# Patient Record
Sex: MALE
[De-identification: names, ages, dates, MRNs, and addresses within clinical notes are randomized per-mention and may not be internally consistent; named-entity substitution may affect disease eponyms.]

---

## 2017-04-26 ENCOUNTER — HOSPITAL ENCOUNTER (OUTPATIENT)
Dept: HOSPITAL 57 - HPCALD | Age: 77
Discharge: HOME | End: 2017-04-26
Attending: FAMILY MEDICINE
Payer: MEDICARE

## 2017-04-26 DIAGNOSIS — I10: ICD-10-CM

## 2017-04-26 DIAGNOSIS — E78.5: ICD-10-CM

## 2017-04-26 DIAGNOSIS — E11.9: Primary | ICD-10-CM

## 2017-04-26 LAB
ALBUMIN SERPL BCG-MCNC: 4.2 G/DL (ref 3.4–4.8)
ALP SERPL-CCNC: 75 U/L (ref 40–150)
ALT SERPL W P-5'-P-CCNC: 11 U/L (ref 0–55)
ANION GAP SERPL CALC-SCNC: 15 MMOL/L (ref 10–20)
AST SERPL-CCNC: 13 U/L (ref 5–34)
BASOPHILS # BLD AUTO: 0.1 THOU/UL (ref 0–0.2)
BASOPHILS NFR BLD AUTO: 0.8 % (ref 0–1)
BILIRUB SERPL-MCNC: 0.4 MG/DL (ref 0.2–1.2)
BUN SERPL-MCNC: 14 MG/DL (ref 8.4–25.7)
CALCIUM SERPL-MCNC: 9.4 MG/DL (ref 7.8–10.44)
CHD RISK SERPL-RTO: 3.9 (ref ?–4.5)
CHLORIDE SERPL-SCNC: 98 MMOL/L (ref 98–107)
CHOLEST SERPL-MCNC: 106 MG/DL
CO2 SERPL-SCNC: 27 MMOL/L (ref 23–31)
CREAT CL PREDICTED SERPL C-G-VRATE: 0 ML/MIN (ref 70–130)
EOSINOPHIL # BLD AUTO: 0.2 THOU/UL (ref 0–0.7)
EOSINOPHIL NFR BLD AUTO: 2.8 % (ref 0–10)
GLOBULIN SER CALC-MCNC: 3.3 G/DL (ref 2.4–3.5)
GLUCOSE SERPL-MCNC: 169 MG/DL (ref 83–110)
HDLC SERPL-MCNC: 27 MG/DL
HGB BLD-MCNC: 12.4 G/DL (ref 14–18)
LDLC SERPL CALC-MCNC: 56 MG/DL
LYMPHOCYTES # BLD AUTO: 2.4 THOU/UL (ref 1.2–3.4)
LYMPHOCYTES NFR BLD AUTO: 33.7 % (ref 21–51)
MCH RBC QN AUTO: 28.9 PG (ref 27–31)
MCV RBC AUTO: 86.8 FL (ref 80–94)
MONOCYTES # BLD AUTO: 0.6 THOU/UL (ref 0.11–0.59)
MONOCYTES NFR BLD AUTO: 8.3 % (ref 0–10)
NEUTROPHILS # BLD AUTO: 3.8 THOU/UL (ref 1.4–6.5)
NEUTROPHILS NFR BLD AUTO: 54.4 % (ref 42–75)
PLATELET # BLD AUTO: 329 THOU/UL (ref 130–400)
POTASSIUM SERPL-SCNC: 4.6 MMOL/L (ref 3.5–5.1)
RBC # BLD AUTO: 4.3 MILL/UL (ref 4.7–6.1)
SODIUM SERPL-SCNC: 135 MMOL/L (ref 136–145)
TRIGL SERPL-MCNC: 116 MG/DL (ref ?–150)
WBC # BLD AUTO: 7 THOU/UL (ref 4.8–10.8)

## 2017-04-26 PROCEDURE — 85025 COMPLETE CBC W/AUTO DIFF WBC: CPT

## 2017-04-26 PROCEDURE — 83036 HEMOGLOBIN GLYCOSYLATED A1C: CPT

## 2017-04-26 PROCEDURE — 80061 LIPID PANEL: CPT

## 2017-04-26 PROCEDURE — 36415 COLL VENOUS BLD VENIPUNCTURE: CPT

## 2017-04-26 PROCEDURE — 80053 COMPREHEN METABOLIC PANEL: CPT

## 2017-12-20 ENCOUNTER — HOSPITAL ENCOUNTER (INPATIENT)
Dept: HOSPITAL 92 - ERS | Age: 77
LOS: 2 days | Discharge: HOME | DRG: 871 | End: 2017-12-22
Attending: INTERNAL MEDICINE | Admitting: INTERNAL MEDICINE
Payer: MEDICARE

## 2017-12-20 VITALS — BODY MASS INDEX: 26.6 KG/M2

## 2017-12-20 DIAGNOSIS — E11.22: ICD-10-CM

## 2017-12-20 DIAGNOSIS — I12.9: ICD-10-CM

## 2017-12-20 DIAGNOSIS — J18.9: ICD-10-CM

## 2017-12-20 DIAGNOSIS — G93.41: ICD-10-CM

## 2017-12-20 DIAGNOSIS — E87.1: ICD-10-CM

## 2017-12-20 DIAGNOSIS — N18.2: ICD-10-CM

## 2017-12-20 DIAGNOSIS — A41.9: Primary | ICD-10-CM

## 2017-12-20 DIAGNOSIS — Z79.4: ICD-10-CM

## 2017-12-20 LAB
ALP SERPL-CCNC: 58 U/L (ref 40–150)
ALT SERPL W P-5'-P-CCNC: 15 U/L (ref 8–55)
ANION GAP SERPL CALC-SCNC: 15 MMOL/L (ref 10–20)
APTT PPP: 31.8 SEC (ref 22.9–36.1)
AST SERPL-CCNC: 22 U/L (ref 5–34)
BASOPHILS # BLD AUTO: 0 THOU/UL (ref 0–0.2)
BASOPHILS NFR BLD AUTO: 0.4 % (ref 0–1)
BILIRUB SERPL-MCNC: 0.6 MG/DL (ref 0.2–1.2)
BUN SERPL-MCNC: 20 MG/DL (ref 8.4–25.7)
CALCIUM SERPL-MCNC: 9.1 MG/DL (ref 7.8–10.44)
CHLORIDE SERPL-SCNC: 84 MMOL/L (ref 98–107)
CK SERPL-CCNC: 62 U/L (ref 30–200)
CO2 SERPL-SCNC: 25 MMOL/L (ref 23–31)
CREAT CL PREDICTED SERPL C-G-VRATE: 0 ML/MIN (ref 70–130)
EOSINOPHIL # BLD AUTO: 0 THOU/UL (ref 0–0.7)
EOSINOPHIL NFR BLD AUTO: 0 % (ref 0–10)
GLOBULIN SER CALC-MCNC: 3.8 G/DL (ref 2.4–3.5)
HCT VFR BLD CALC: 33.8 % (ref 42–52)
HYALINE CASTS #/AREA URNS LPF: (no result) LPF
LACTATE SERPL-SCNC: 1.4 MMOL/L (ref 0.5–2.2)
LIPASE SERPL-CCNC: 49 U/L (ref 8–78)
LYMPHOCYTES # BLD: 0.8 THOU/UL (ref 1.2–3.4)
LYMPHOCYTES NFR BLD AUTO: 9.6 % (ref 21–51)
MODIFIED ALLEN'S TEST: POSITIVE
MONOCYTES # BLD AUTO: 0.6 THOU/UL (ref 0.11–0.59)
MONOCYTES NFR BLD AUTO: 7.3 % (ref 0–10)
NEUTROPHILS # BLD AUTO: 6.8 THOU/UL (ref 1.4–6.5)
PROTHROMBIN TIME: 12.8 SEC (ref 12–14.7)
RBC # BLD AUTO: 3.93 MILL/UL (ref 4.7–6.1)
RBC UR QL AUTO: (no result) HPF (ref 0–3)
SODIUM SERPL-SCNC: 122 MMOL/L (ref 135–148)
TROPONIN I SERPL DL<=0.01 NG/ML-MCNC: 0.01 NG/ML (ref ?–0.03)
VENT: NO
WBC # BLD AUTO: 8.2 THOU/UL (ref 4.8–10.8)
WBC UR QL AUTO: (no result) HPF (ref 0–3)

## 2017-12-20 PROCEDURE — 84484 ASSAY OF TROPONIN QUANT: CPT

## 2017-12-20 PROCEDURE — 80053 COMPREHEN METABOLIC PANEL: CPT

## 2017-12-20 PROCEDURE — 82805 BLOOD GASES W/O2 SATURATION: CPT

## 2017-12-20 PROCEDURE — 85730 THROMBOPLASTIN TIME PARTIAL: CPT

## 2017-12-20 PROCEDURE — 81003 URINALYSIS AUTO W/O SCOPE: CPT

## 2017-12-20 PROCEDURE — 85025 COMPLETE CBC W/AUTO DIFF WBC: CPT

## 2017-12-20 PROCEDURE — 71010: CPT

## 2017-12-20 PROCEDURE — 36416 COLLJ CAPILLARY BLOOD SPEC: CPT

## 2017-12-20 PROCEDURE — 82550 ASSAY OF CK (CPK): CPT

## 2017-12-20 PROCEDURE — 96365 THER/PROPH/DIAG IV INF INIT: CPT

## 2017-12-20 PROCEDURE — 83605 ASSAY OF LACTIC ACID: CPT

## 2017-12-20 PROCEDURE — 36415 COLL VENOUS BLD VENIPUNCTURE: CPT

## 2017-12-20 PROCEDURE — 87040 BLOOD CULTURE FOR BACTERIA: CPT

## 2017-12-20 PROCEDURE — 85007 BL SMEAR W/DIFF WBC COUNT: CPT

## 2017-12-20 PROCEDURE — 83880 ASSAY OF NATRIURETIC PEPTIDE: CPT

## 2017-12-20 PROCEDURE — 85027 COMPLETE CBC AUTOMATED: CPT

## 2017-12-20 PROCEDURE — 85610 PROTHROMBIN TIME: CPT

## 2017-12-20 PROCEDURE — 96361 HYDRATE IV INFUSION ADD-ON: CPT

## 2017-12-20 PROCEDURE — 82010 KETONE BODYS QUAN: CPT

## 2017-12-20 PROCEDURE — 82553 CREATINE MB FRACTION: CPT

## 2017-12-20 PROCEDURE — 80048 BASIC METABOLIC PNL TOTAL CA: CPT

## 2017-12-20 PROCEDURE — 81015 MICROSCOPIC EXAM OF URINE: CPT

## 2017-12-20 PROCEDURE — 93005 ELECTROCARDIOGRAM TRACING: CPT

## 2017-12-20 PROCEDURE — 83690 ASSAY OF LIPASE: CPT

## 2017-12-20 NOTE — RAD
CHEST 1 VIEW:

 

HISTORY: 

Fever.

 

COMPARISON: 

Chest radiograph from 2009.

 

FINDINGS: 

New left lower lobe airspace opacity.  There are scattered granulomas throughout the lungs.

 

Decreased subacromial space bilaterally.

 

IMPRESSION: 

Left lower lobe airspace opacity suggesting pneumonia.  Followup after treatment recommended.

 

POS: [facility]

## 2017-12-21 LAB
ANION GAP SERPL CALC-SCNC: 13 MMOL/L (ref 10–20)
BUN SERPL-MCNC: 8 MG/DL (ref 8.4–25.7)
CALCIUM SERPL-MCNC: 9.1 MG/DL (ref 7.8–10.44)
CHLORIDE SERPL-SCNC: 97 MMOL/L (ref 98–107)
CO2 SERPL-SCNC: 24 MMOL/L (ref 23–31)
CREAT CL PREDICTED SERPL C-G-VRATE: 78 ML/MIN (ref 70–130)
HCT VFR BLD CALC: 31.6 % (ref 42–52)
RBC # BLD AUTO: 3.65 MILL/UL (ref 4.7–6.1)
WBC # BLD AUTO: 4.9 THOU/UL (ref 4.8–10.8)

## 2017-12-21 NOTE — PDOC.PN
- Subjective


Encounter Start Date: 12/21/17


Encounter Start Time: 16:00


Subjective: f/u for LLL PNA and suspected sepsis. Overall feeling well today.


-: Appetite improved. Less weakness. Continues on Levaquin.





- Objective


Resuscitation Status: 


 











Resuscitation Status           FULL:Full Resuscitation














MAR Reviewed: Yes


Vital Signs & Weight: 


 Vital Signs (12 hours)











  Temp Pulse Resp BP BP Pulse Ox


 


 12/21/17 08:28   91   130/69  


 


 12/21/17 08:16  97.9 F  91  20   130/69  90 L


 


 12/21/17 08:00  97.9 F  91  20   








 Weight











Weight                         165 lb 5.547 oz














I&O: 


 











 12/20/17 12/21/17 12/22/17





 06:59 06:59 06:59


 


Intake Total  950 


 


Output Total  800 


 


Balance  150 











Result Diagrams: 


 12/21/17 04:32





 12/21/17 04:32


Additional Labs: 


 Accuchecks











  12/21/17 12/21/17 12/20/17





  12:00 04:15 21:08


 


POC Glucose  145 H  98  239 H








Microbiology





12/20/17 01:35   Nasopharyngeal swab   Influenza Types A,B Direct EIA - Final


12/20/17 00:48   Venous blood - Right Arm   Blood Culture - Preliminary


                              Specimen has been received and culture in 

progress.


                              No Growth to date.


12/20/17 00:40   Venous blood - Left Arm   Blood Culture - Preliminary


                              Specimen has been received and culture in 

progress.


                              No Growth to date.





 Laboratory Tests











  12/20/17 12/20/17 12/21/17





  00:40 00:40 04:32


 


Hgb  11.7 L  


 


Neutrophils %  82.8 H  


 


Neutrophils % (Manual)    62


 


Sodium   120 L 


 


Creatinine   1.26 














Phys Exam





- Physical Examination


Constitutional: NAD


HEENT: PERRLA, oral pharynx no lesions


Neck: no JVD, supple


Respiratory: no wheezing, clear to auscultation bilateral


Cardiovascular: RRR


Gastrointestinal: soft, non-tender, no distention, positive bowel sounds


Musculoskeletal: no edema, pulses present


Neurological: normal sensation, moves all 4 limbs


Psychiatric: A&O x 3


Skin: normal turgor, cap refill <2 seconds





Dx/Plan


(1) Sepsis


Code(s): A41.9 - SEPSIS, UNSPECIFIED ORGANISM   Status: Suspected   Comment: 

Resolving, continue treatment as outlined in #2   





(2) LLL pneumonia


Code(s): J18.1 - LOBAR PNEUMONIA, UNSPECIFIED ORGANISM   Status: Acute   Comment

: Suspected with gm+ cocci, continue Levaquin, general pulmonary supportive 

measures   





(3) Hyponatremia


Code(s): E87.1 - HYPO-OSMOLALITY AND HYPONATREMIA   Status: Acute   Comment: 

Improved, continue IV NS another 24h, repeat Na+ in am   





(4) Acute metabolic encephalopathy


Code(s): G93.41 - METABOLIC ENCEPHALOPATHY   Status: Acute   Comment: Resolved 

  





(5) CKD (chronic kidney disease), stage II


Code(s): N18.2 - CHRONIC KIDNEY DISEASE, STAGE 2 (MILD)   Status: Chronic   





(6) DM II (diabetes mellitus, type II), controlled


Code(s): E11.9 - TYPE 2 DIABETES MELLITUS WITHOUT COMPLICATIONS   Status: 

Chronic   Comment: Continue Levemir 30u SC BID, ISS   





- Plan


continue antibiotics, PT/OT, , out of bed/ambulate, DVT proph w/

SCDs


Stable overall


-: Continue Levaquin 750mg IV daily x 24h then convert to po


-: Continue IV NS 100ml/h


-: OOB/ambulate


-: AM lab: BMP, CBC





* .

## 2017-12-22 VITALS — DIASTOLIC BLOOD PRESSURE: 76 MMHG | TEMPERATURE: 97.6 F | SYSTOLIC BLOOD PRESSURE: 144 MMHG

## 2017-12-22 LAB
ANION GAP SERPL CALC-SCNC: 14 MMOL/L (ref 10–20)
BUN SERPL-MCNC: 5 MG/DL (ref 8.4–25.7)
CALCIUM SERPL-MCNC: 8.9 MG/DL (ref 7.8–10.44)
CHLORIDE SERPL-SCNC: 98 MMOL/L (ref 98–107)
CO2 SERPL-SCNC: 23 MMOL/L (ref 23–31)
CREAT CL PREDICTED SERPL C-G-VRATE: 0 ML/MIN (ref 70–130)
HCT VFR BLD CALC: 33.2 % (ref 42–52)
RBC # BLD AUTO: 3.77 MILL/UL (ref 4.7–6.1)
WBC # BLD AUTO: 4.8 THOU/UL (ref 4.8–10.8)

## 2017-12-22 NOTE — DIS
DATE OF ADMISSION:  12/20/2017

 

DATE OF DISCHARGE:  12/22/2017

 

DISCHARGE DIAGNOSES:

1.  Sepsis syndrome, secondary to #2, resolving.

2.  Left lower lobe community-acquired pneumonia with suspected gram-positive cocci, improved.

3.  Hyponatremia, multifactorial, improved.

5.  Acute metabolic encephalopathy, resolved.

6.  Chronic kidney disease, stage 2, stable.

7.  Diabetes mellitus, type 2, insulin-requiring, stable.

 

CONSULTATIONS:  None.

 

PERTINENT LABORATORY AND X-RAY FINDINGS:  Sodium level ranged between 120-131, creatinine ranged betw
een 0.86-1.26 with estimated GFR ranging between 56-86.  Lactic acid level 1.4.  BNP 31.2, albumin 4.
0, lipase 49.  CBC showed a white blood cell count ranging between 4.9-8.2, hemoglobin ranging betwee
n 10.4-11.7.  Beta-hydroxybutyrate level 0.16.  Blood cultures x2 dated 12/20/2017 showed no growth t
o date.  Influenza A and B antigen 12/20/2017 negative.  Portable chest x-ray dated 12/20/2017 showed
 left lower lobe opacity suggesting pneumonia.

 

HOSPITAL COURSE:  Patient was admitted to the medical floor after presenting with confusion, fever, a
nd chest imaging showing infiltrate of the left lower lobe concerning for pneumonia.  Patient was ini
tially managed with IV Levaquin 750 mg daily, as well as bronchodilator therapy with DuoNeb.  Patient
 was also noted with concomitant hyponatremia, initiated on intravenous normal saline with serial sod
ium evaluation showing overall improvement by the time of discharge.  The patient was also noted with
 mild encephalopathy secondarily to his presentation with pneumonia and hyponatremia, symptomatically
 managed and resolved by the time of discharge.  Overall, the patient did remain clinically stable th
roughout the hospital course, tolerating regular oral intake, ambulating without assistance or diffic
ulty and voiding appropriately.  The patient overall clinically at baseline functional status and ghazal
dy for discharge 12/22/2017.

 

DISCHARGE MEDICATIONS:

1.  Levaquin 500 mg 1 tab p.o. daily x7 days.

2.  Enteric-coated aspirin 81 mg 1 tab p.o. daily.

3.  Felodipine ER 5 mg p.o. daily.

4.  Glargine insulin 30 units subcutaneously b.i.d.

5.  Metoprolol tartrate 100 mg p.o. b.i.d.

6.  Zocor 40 mg p.o. at bedtime.

 

FOLLOWUP:  Patient will follow up with his primary care provider, Dr. Sky Garcias, within 7 days.

 

CONDITION ON DISCHARGE:  Stable.

 

ACTIVITY:  Ad genevieve.

 

DIET:  Heart healthy and ADA.

 

CODE STATUS:  FULL.

 

DISPOSITION:  Home, 12/22/2017.

## 2017-12-25 NOTE — EKG
Test Reason : 

Blood Pressure : ***/*** mmHG

Vent. Rate : 080 BPM     Atrial Rate : 080 BPM

   P-R Int : 148 ms          QRS Dur : 094 ms

    QT Int : 422 ms       P-R-T Axes : 065 043 063 degrees

   QTc Int : 486 ms

 

Normal sinus rhythm with sinus arrhythmia

Prolonged QT

Abnormal ECG

 

Confirmed by SUAD CORDON, ARSEN ([id number]),  MAIKOL GAYLE ([id number]) on 12/25/2017 1:52:46 PM

 

Referred By:  SUAD           Confirmed By:ARSEN BORJAS MD

## 2017-12-26 NOTE — PQF
ERIK CARVALHORADHA DO

[id number]                                                             [id number]
[id number]

[id number]                             

                                   

CLINICAL DOCUMENTATION CLARIFICATION FORM:  POST DISCHARGE



Addendum to original discharge summary date:  __________________________________
____



Late entry note date:  _________________________________________________________
__











DATE: 12/26/2017                                      ATTN:   



Please exercise your independent, professional judgment in responding to the 
clarification form. 

Clinical indicators are provided on the bottom of this form for your review



Please check appropriate box(s):

 [  x] Sepsis due to: (Pna, UTI, gangrenous gall bladder, etc.) ___Pneumonia____
__________________________________

                 Due to:  [  ] Device (please specify) _________________________
_________________________________  

         [  ] Implant   [  ] Graft   [  ] Infusion      

[  ] SIRS due to non-infectious process (please specify etiology) ______________
______________________

[  ] with organ dysfunction     [  ] without organ dysfunction

[  ] Severe sepsis with acute organ dysfunction of: ____________________________
______________________

(Examples: respiratory failure, encephalopathy, acute kidney failure, other) 

[  ] Localized infection without sepsis

[  ] Other diagnosis ___________

[  ] Unable to determine



In addition, please specify:

Present on Admission (POA):  [ x ] Yes             [  ] No             [  ] 
Unable to determine



For continuity of documentation, please document condition throughout progress 
notes and discharge summary.  Thank You.



CLINICAL INDICATORS - SIGNS / SYMPTOMS / LABS:

Altered mental status

Fever 101.0, PULSE: 81, RESP: 30, /59

 H&P - SEPSIS SYNDROME 2/2 TO LLL COMMUNITY-ACQUIRED PNEUMONIA, LIKELY GRAM 
POSITIVE COCCI

 PN - SEPSIS

 DS-  SEPSIS SYNDROME





RISK FACTORS:

PNEUMONIA

HYPONATREMIA

ACUTE METABOLIC ENCEPHALOPATHY

DIABETES



TREATMENTS:

Initiation SEPSIS PROTOCOL

IV ANTIBIOTICS













(This form is maintained as a part of the permanent medical record)

2015 [employer].  All Rights Reserved

Tata Jauregui, ALIE, [facility]    [email or web address]    
[phone]

                                                              



 

MITZI

## 2019-07-24 ENCOUNTER — HOSPITAL ENCOUNTER (INPATIENT)
Dept: HOSPITAL 92 - ERS | Age: 79
LOS: 4 days | Discharge: HOME | DRG: 645 | End: 2019-07-28
Attending: INTERNAL MEDICINE | Admitting: INTERNAL MEDICINE
Payer: MEDICARE

## 2019-07-24 VITALS — BODY MASS INDEX: 23.3 KG/M2

## 2019-07-24 DIAGNOSIS — Z90.49: ICD-10-CM

## 2019-07-24 DIAGNOSIS — Z79.4: ICD-10-CM

## 2019-07-24 DIAGNOSIS — Z79.899: ICD-10-CM

## 2019-07-24 DIAGNOSIS — M48.061: ICD-10-CM

## 2019-07-24 DIAGNOSIS — Z95.5: ICD-10-CM

## 2019-07-24 DIAGNOSIS — N18.2: ICD-10-CM

## 2019-07-24 DIAGNOSIS — E11.22: ICD-10-CM

## 2019-07-24 DIAGNOSIS — E22.2: Primary | ICD-10-CM

## 2019-07-24 DIAGNOSIS — D63.1: ICD-10-CM

## 2019-07-24 DIAGNOSIS — E78.5: ICD-10-CM

## 2019-07-24 DIAGNOSIS — I25.2: ICD-10-CM

## 2019-07-24 DIAGNOSIS — Z79.82: ICD-10-CM

## 2019-07-24 DIAGNOSIS — I25.10: ICD-10-CM

## 2019-07-24 DIAGNOSIS — M40.56: ICD-10-CM

## 2019-07-24 DIAGNOSIS — M54.16: ICD-10-CM

## 2019-07-24 DIAGNOSIS — I12.9: ICD-10-CM

## 2019-07-24 LAB
ALBUMIN SERPL BCG-MCNC: 4.1 G/DL (ref 3.4–4.8)
ALP SERPL-CCNC: 82 U/L (ref 40–150)
ALT SERPL W P-5'-P-CCNC: (no result) U/L (ref 8–55)
ANION GAP SERPL CALC-SCNC: 12 MMOL/L (ref 10–20)
ANION GAP SERPL CALC-SCNC: 15 MMOL/L (ref 10–20)
ANION GAP SERPL CALC-SCNC: 15 MMOL/L (ref 10–20)
AST SERPL-CCNC: 15 U/L (ref 5–34)
BACTERIA UR QL AUTO: (no result) HPF
BASOPHILS # BLD AUTO: 0.1 THOU/UL (ref 0–0.2)
BASOPHILS NFR BLD AUTO: 0.4 % (ref 0–1)
BILIRUB SERPL-MCNC: 0.8 MG/DL (ref 0.2–1.2)
BUN SERPL-MCNC: 14 MG/DL (ref 8.4–25.7)
BUN SERPL-MCNC: 14 MG/DL (ref 8.4–25.7)
BUN SERPL-MCNC: 15 MG/DL (ref 8.4–25.7)
CALCIUM SERPL-MCNC: 9.2 MG/DL (ref 7.8–10.44)
CALCIUM SERPL-MCNC: 9.7 MG/DL (ref 7.8–10.44)
CALCIUM SERPL-MCNC: 9.9 MG/DL (ref 7.8–10.44)
CHLORIDE SERPL-SCNC: 86 MMOL/L (ref 98–107)
CHLORIDE SERPL-SCNC: 86 MMOL/L (ref 98–107)
CHLORIDE SERPL-SCNC: 87 MMOL/L (ref 98–107)
CO2 SERPL-SCNC: 22 MMOL/L (ref 23–31)
CO2 SERPL-SCNC: 23 MMOL/L (ref 23–31)
CO2 SERPL-SCNC: 23 MMOL/L (ref 23–31)
CREAT CL PREDICTED SERPL C-G-VRATE: 0 ML/MIN (ref 70–130)
CREAT CL PREDICTED SERPL C-G-VRATE: 50 ML/MIN (ref 70–130)
CREAT CL PREDICTED SERPL C-G-VRATE: 50 ML/MIN (ref 70–130)
EOSINOPHIL # BLD AUTO: 0.2 THOU/UL (ref 0–0.7)
EOSINOPHIL NFR BLD AUTO: 1.6 % (ref 0–10)
GLOBULIN SER CALC-MCNC: 3.9 G/DL (ref 2.4–3.5)
GLUCOSE SERPL-MCNC: 142 MG/DL (ref 83–110)
GLUCOSE SERPL-MCNC: 174 MG/DL (ref 83–110)
GLUCOSE SERPL-MCNC: 265 MG/DL (ref 83–110)
HGB BLD-MCNC: 11.1 G/DL (ref 14–18)
IRON SERPL-MCNC: 31 UG/DL (ref 65–175)
LYMPHOCYTES # BLD: 1.9 THOU/UL (ref 1.2–3.4)
LYMPHOCYTES NFR BLD AUTO: 14.4 % (ref 21–51)
MCH RBC QN AUTO: 27.7 PG (ref 27–31)
MCV RBC AUTO: 85.1 FL (ref 78–98)
MONOCYTES # BLD AUTO: 1.1 THOU/UL (ref 0.11–0.59)
MONOCYTES NFR BLD AUTO: 8.6 % (ref 0–10)
NEUTROPHILS # BLD AUTO: 9.9 THOU/UL (ref 1.4–6.5)
NEUTROPHILS NFR BLD AUTO: 75 % (ref 42–75)
PLATELET # BLD AUTO: 365 THOU/UL (ref 130–400)
POTASSIUM SERPL-SCNC: 3.7 MMOL/L (ref 3.5–5.1)
POTASSIUM SERPL-SCNC: 3.8 MMOL/L (ref 3.5–5.1)
POTASSIUM SERPL-SCNC: 4 MMOL/L (ref 3.5–5.1)
RBC # BLD AUTO: 3.99 MILL/UL (ref 4.7–6.1)
RBC UR QL AUTO: (no result) HPF (ref 0–3)
SODIUM SERPL-SCNC: 117 MMOL/L (ref 136–145)
SODIUM SERPL-SCNC: 120 MMOL/L (ref 136–145)
SODIUM SERPL-SCNC: 120 MMOL/L (ref 136–145)
UIBC SERPL-MCNC: 274 MCG/DL (ref 261–462)
WBC # BLD AUTO: 13.1 THOU/UL (ref 4.8–10.8)
WBC UR QL AUTO: (no result) HPF (ref 0–3)

## 2019-07-24 PROCEDURE — 81003 URINALYSIS AUTO W/O SCOPE: CPT

## 2019-07-24 PROCEDURE — 83036 HEMOGLOBIN GLYCOSYLATED A1C: CPT

## 2019-07-24 PROCEDURE — C9488 CONIVAPTAN HCL: HCPCS

## 2019-07-24 PROCEDURE — 85025 COMPLETE CBC W/AUTO DIFF WBC: CPT

## 2019-07-24 PROCEDURE — 82607 VITAMIN B-12: CPT

## 2019-07-24 PROCEDURE — 90471 IMMUNIZATION ADMIN: CPT

## 2019-07-24 PROCEDURE — 93010 ELECTROCARDIOGRAM REPORT: CPT

## 2019-07-24 PROCEDURE — 84300 ASSAY OF URINE SODIUM: CPT

## 2019-07-24 PROCEDURE — 36416 COLLJ CAPILLARY BLOOD SPEC: CPT

## 2019-07-24 PROCEDURE — 71045 X-RAY EXAM CHEST 1 VIEW: CPT

## 2019-07-24 PROCEDURE — 36415 COLL VENOUS BLD VENIPUNCTURE: CPT

## 2019-07-24 PROCEDURE — 90670 PCV13 VACCINE IM: CPT

## 2019-07-24 PROCEDURE — 82533 TOTAL CORTISOL: CPT

## 2019-07-24 PROCEDURE — 83550 IRON BINDING TEST: CPT

## 2019-07-24 PROCEDURE — 80053 COMPREHEN METABOLIC PANEL: CPT

## 2019-07-24 PROCEDURE — 83540 ASSAY OF IRON: CPT

## 2019-07-24 PROCEDURE — 81015 MICROSCOPIC EXAM OF URINE: CPT

## 2019-07-24 PROCEDURE — G0009 ADMIN PNEUMOCOCCAL VACCINE: HCPCS

## 2019-07-24 PROCEDURE — 80048 BASIC METABOLIC PNL TOTAL CA: CPT

## 2019-07-24 PROCEDURE — 84484 ASSAY OF TROPONIN QUANT: CPT

## 2019-07-24 PROCEDURE — 84443 ASSAY THYROID STIM HORMONE: CPT

## 2019-07-24 PROCEDURE — 83935 ASSAY OF URINE OSMOLALITY: CPT

## 2019-07-24 PROCEDURE — 93005 ELECTROCARDIOGRAM TRACING: CPT

## 2019-07-24 PROCEDURE — 72131 CT LUMBAR SPINE W/O DYE: CPT

## 2019-07-24 RX ADMIN — INSULIN LISPRO PRN UNIT: 100 INJECTION, SOLUTION INTRAVENOUS; SUBCUTANEOUS at 17:21

## 2019-07-24 RX ADMIN — INSULIN GLARGINE SCH MLS: 100 INJECTION, SOLUTION SUBCUTANEOUS at 20:45

## 2019-07-24 RX ADMIN — INSULIN LISPRO PRN UNIT: 100 INJECTION, SOLUTION INTRAVENOUS; SUBCUTANEOUS at 12:15

## 2019-07-24 NOTE — RAD
CHEST 1 VIEW:

 

Date:  07/24/19 

 

INDICATION:

Diabetic patient with lethargy, and leg and hip pain. 

 

FINDINGS:

There is scattered calcified granuloma. Heart size upper limits of normal. There is scattered degener
ative change. 

 

IMPRESSION: 

No acute abnormality. 

 

 

POS: [facility]

## 2019-07-24 NOTE — PDOC.EVN
Event Note





- Event Note


Event Note: 





daughter and i had long conversation. she is not happy with my assessment. she 

requested different hospitalist

## 2019-07-24 NOTE — RAD
LEFT HIP 2 VIEWS:

 

Date:  07/24/19 

 

INDICATION:

Left hip pain. 

 

COMPARISON:  

None. 

 

FINDINGS/IMPRESSION: 

There is mild degenerative arthrosis of the left hip. There are vascular calcifications within the ad
jacent soft tissues. There is diffuse osteopenia. No definite acute fracture or subluxation is demons
trated. 

 

 

POS: [facility]

## 2019-07-24 NOTE — CT
CT LUMBAR SPINE:



Date:  7/24/2019



COMPARISON: None.



HISTORY: Lumbar radiculopathy.



TECHNIQUE: Axial CT imaging at 2.5 mm intervals through the lumbar spine without contrast with corona
l and sagittal reformatted imaging.



FINDINGS: 

Evaluation for central canal and/or neural foraminal stenosis is limited on routine CT examination.



There is mild anterolisthesis at L3-4 measuring approximately 4-5 mm.



There is extensive atherosclerotic calcification of the abdominal aorta and its branches, with promin
ent atherosclerotic calcification of the infrarenal abdominal aorta and the pelvic arterial

structures.



There is significant bilateral sacroiliac joint degenerative change with anterior osteophyte formatio
n noted bilaterally, right greater than left.



T12-L1: There is disc space narrowing and anterior osteophyte formation with bilateral facet hypertro
phy and probable mild disc bulge. There is probable mild bilateral neural foraminal stenosis. No

osseous cause of significant central canal stenosis.



L1-2: Disc space narrowing, vacuum disc formation, and posterior disc osteophyte complex. Bilateral f
acet hypertrophy and hypertrophy of ligamentum flavum. Moderate central canal stenosis suspected

with at least moderate bilateral neural foraminal stenosis.



L2-3: Bilateral facet hypertrophy. Disc bulge noted with prominent ligamentum flavum hypertrophy. Sev
ere central canal stenosis is suspected. Moderate bilateral neural foraminal stenosis, left greater

than right.



L3-4: There is disc space narrowing and vacuum disc formation with severe bilateral facet hypertrophy
 and hypertrophy of the ligamentum flavum. Severe central canal stenosis. Severe bilateral neural

foraminal stenosis



L4-5: Severe degenerative endplate changes are present with disc space narrowing and vacuum disc form
ation. There is posterior osteophyte and there is osteophyte encroachment on bilateral neural

foramina, right greater than left, with severe central canal stenosis and severe bilateral neural for
aminal stenosis



L5-S1: There is disc space narrowing and vacuum disc formation with bilateral facet hypertrophy and d
isc osteophyte complex. Probable mild central canal stenosis. Moderate/severe bilateral neural

foraminal stenosis.



No worrisome lytic or blastic bone lesion. No acute fracture or dislocation.



IMPRESSION: 

Severe multilevel lumbar spine degenerative change as described above. Evaluation for central canal o
r neural foraminal stenosis could be best assessed via MRI or lumbar spine myelogram as clinically

warranted.



Transcribed Date/Time: 7/24/2019 11:11 AM



Reported By: Binu Rapp 

Electronically Signed:  7/24/2019 3:11 PM

## 2019-07-24 NOTE — HP
PRIMARY CARE PHYSICIAN:  Sky Garcias MD.



HISTORY OF PRESENT ILLNESS:  The patient referred to [facility] 
for

weakness and hyponatremia.  The patient presented with left leg weakness.  He 
states

he has pain in his left hip, down his left leg present for 2 weeks.  He has had 
no

medicines for it.  He denies any other problems, specifically no weakness,

dizziness, etc. 



PAST MEDICAL HISTORY:  Pertinent for,

1. Diabetes mellitus type 2, insulin dependent.

2. Hypertension.

3. Hyperlipidemia.

4. Coronary artery disease, post MI in the distant past.



PAST SURGICAL HISTORY:  

1. Laparoscopic cholecystectomy.

2. Post PCI and cardiac cath.



CURRENT MEDICATIONS:  

1. Metoprolol 100 mg twice a day.

2. Zocor 40 mg a day.

3. Aspirin 81 mg a day.

4. Lantus, unknown dose.

5. Lisinopril 20 mg a day.

6. Unknown insulin, family is going to obtain that.



ALLERGIES:  NONE.



FAMILY HISTORY:  Positive for hypertension.  The patient resides in [place].

.  Full code status.  Decision maker is Tita Cantrell.  Brother with

diabetes. 



SOCIAL HISTORY:  No tobacco.  No alcohol.



REVIEW OF SYSTEMS:  GENERAL:  No headaches, dizziness, fainting, fever, or 
chills. 

EYES:  Glasses.  No double vision, blurred vision, or flashing lights. 

EARS, NOSE, AND THROAT:  No ear pain or drainage.  No nasal bleeding.  No 
trouble

swallowing. 

CARDIAC:  No chest pain, orthopnea, or paroxysmal nocturnal dyspnea. 

RESPIRATION:  No cough, wheezing, or asthma. 

GASTROINTESTINAL:  He states he has a poor appetite.  He has lost 8 to 9 pounds 
in

the past 2 weeks.  He has had no nausea, vomiting, abdominal pain, or diarrhea, 
etc. 

GENITOURINARY:  No hematuria or dysuria. 

MUSCULOSKELETAL:  Other than the present illness, no pain in his legs or arms. 

NEUROLOGIC:  No strokes, seizures, or focal weakness. 

PSYCHIATRIC:  No anxiety or depression. 

SKIN:  No bruising, bleeding, or rash. 

HEME/LYMPH:  No tender or swollen lymph nodes in the axilla, inguinal, or 
cervical

area. 

PSYCHIATRIC:  The patient is alert, appropriate, and cooperative.  Family at 
bedside.



PHYSICAL EXAMINATION:

VITAL SIGNS:  Blood pressure 148/84, pulse 69, respirations 18, and temperature

97.8. 

HEENT:  Examination of his head, eyes, ears, nose, and throat revealed pupils 
equal

and round.  Extraocular movements are intact.  Sclerae are white.  Tympanic

membranes are clear.  Nose is clear.  Oral mucous membranes are wet.  Dental 
hygiene

is good. 

NECK:  No jugular venous distention, adenopathy, or thyromegaly. 

CHEST:  Clear to auscultation and percussion. 

HEART:  He had a basic underlying regular rhythm with every 4th beat dropped.  
First

and second second heart sounds are clear.  There are no murmurs or gallops. 

ABDOMEN:  Soft.  Bowel sounds are normal.  There is no hepatosplenomegaly.  No 
mass.

 No rebound.  No tenderness.  Bowel sounds are present. 

EXTREMITIES:  No cyanosis, clubbing, or edema. 

PULSES:  Carotid, radial, femoral, and dorsalis pedis pulses intact. 

SKIN:  Warm and dry without bruises or rash. 

HEME/LYMPH:  No tender or swollen lymph nodes in axilla, inguinal, or cervical 
area. 

NEUROLOGIC:  Cranial nerves 2 through 12 are intact.  Moves all extremities.  
Toes

downgoing. 



DIAGNOSTIC DATA:  Chest x-ray, scattered small granulomas.  Cardiac silhouette

normal.  No infiltrates or congestive heart failure, reviewed by me. 



Hip x-ray; no fracture, mild degenerative changes, reviewed by me. 



EKG, none available.  One has been ordered because of his irregular heart.



LABORATORY DATA:  Sodium 120, potassium 4.0, chloride 86, CO2 of 23, BUN 14, and

creatinine 0.99, blood sugar 142.  Liver function tests are normal.  Urine 
shows 4

to 6 white cells, but no nitrite or esterase.  Of note, CBC; white count 13.1,

platelet count 265,000, and hemoglobin 11.1. 



ASSESSMENT:  

1. Hyponatremia.

2. Mild anemia.

3. Left hip pain.

4. Diabetes mellitus type 2.

5. Hypertension.

6. Coronary artery disease.



PLAN:  

1. IV saline at 75 mL an hour.  Cortisol level, TSH level.

2. Iron, iron binding capacity, vitamin B12 level.

3. Accu-Cheks and sliding scale.

4. CT of lumbar spine.  We will re-evaluate when data is available.







Job ID:  [id number]



MTDD

## 2019-07-25 LAB
ANION GAP SERPL CALC-SCNC: 12 MMOL/L (ref 10–20)
BUN SERPL-MCNC: 13 MG/DL (ref 8.4–25.7)
CALCIUM SERPL-MCNC: 9.2 MG/DL (ref 7.8–10.44)
CHLORIDE SERPL-SCNC: 90 MMOL/L (ref 98–107)
CO2 SERPL-SCNC: 21 MMOL/L (ref 23–31)
CREAT CL PREDICTED SERPL C-G-VRATE: 58 ML/MIN (ref 70–130)
GLUCOSE SERPL-MCNC: 148 MG/DL (ref 83–110)
POTASSIUM SERPL-SCNC: 3.3 MMOL/L (ref 3.5–5.1)
SODIUM SERPL-SCNC: 120 MMOL/L (ref 136–145)

## 2019-07-25 RX ADMIN — INSULIN GLARGINE SCH MLS: 100 INJECTION, SOLUTION SUBCUTANEOUS at 20:20

## 2019-07-25 RX ADMIN — DORZOLAMIDE HYDROCHLORIDE AND TIMOLOL MALEATE SCH DROP: 22.3; 6.8 SOLUTION/ DROPS OPHTHALMIC at 20:20

## 2019-07-25 RX ADMIN — INSULIN LISPRO PRN UNIT: 100 INJECTION, SOLUTION INTRAVENOUS; SUBCUTANEOUS at 12:08

## 2019-07-25 RX ADMIN — INSULIN GLARGINE SCH MLS: 100 INJECTION, SOLUTION SUBCUTANEOUS at 12:07

## 2019-07-25 RX ADMIN — INSULIN LISPRO PRN UNIT: 100 INJECTION, SOLUTION INTRAVENOUS; SUBCUTANEOUS at 16:38

## 2019-07-26 LAB
ANION GAP SERPL CALC-SCNC: 11 MMOL/L (ref 10–20)
ANION GAP SERPL CALC-SCNC: 12 MMOL/L (ref 10–20)
BUN SERPL-MCNC: 11 MG/DL (ref 8.4–25.7)
BUN SERPL-MCNC: 11 MG/DL (ref 8.4–25.7)
CALCIUM SERPL-MCNC: 9.1 MG/DL (ref 7.8–10.44)
CALCIUM SERPL-MCNC: 9.2 MG/DL (ref 7.8–10.44)
CHLORIDE SERPL-SCNC: 94 MMOL/L (ref 98–107)
CHLORIDE SERPL-SCNC: 97 MMOL/L (ref 98–107)
CO2 SERPL-SCNC: 24 MMOL/L (ref 23–31)
CO2 SERPL-SCNC: 25 MMOL/L (ref 23–31)
CREAT CL PREDICTED SERPL C-G-VRATE: 56 ML/MIN (ref 70–130)
CREAT CL PREDICTED SERPL C-G-VRATE: 63 ML/MIN (ref 70–130)
GLUCOSE SERPL-MCNC: 131 MG/DL (ref 83–110)
GLUCOSE SERPL-MCNC: 62 MG/DL (ref 83–110)
POTASSIUM SERPL-SCNC: 3.7 MMOL/L (ref 3.5–5.1)
POTASSIUM SERPL-SCNC: 3.8 MMOL/L (ref 3.5–5.1)
SODIUM SERPL-SCNC: 125 MMOL/L (ref 136–145)
SODIUM SERPL-SCNC: 130 MMOL/L (ref 136–145)

## 2019-07-26 RX ADMIN — INSULIN LISPRO PRN UNIT: 100 INJECTION, SOLUTION INTRAVENOUS; SUBCUTANEOUS at 20:26

## 2019-07-26 RX ADMIN — DORZOLAMIDE HYDROCHLORIDE AND TIMOLOL MALEATE SCH DROP: 22.3; 6.8 SOLUTION/ DROPS OPHTHALMIC at 20:05

## 2019-07-26 RX ADMIN — DORZOLAMIDE HYDROCHLORIDE AND TIMOLOL MALEATE SCH DROP: 22.3; 6.8 SOLUTION/ DROPS OPHTHALMIC at 08:40

## 2019-07-26 RX ADMIN — INSULIN LISPRO PRN UNIT: 100 INJECTION, SOLUTION INTRAVENOUS; SUBCUTANEOUS at 11:37

## 2019-07-26 RX ADMIN — INSULIN GLARGINE SCH MLS: 100 INJECTION, SOLUTION SUBCUTANEOUS at 11:39

## 2019-07-26 RX ADMIN — INSULIN GLARGINE SCH MLS: 100 INJECTION, SOLUTION SUBCUTANEOUS at 20:25

## 2019-07-26 RX ADMIN — ASPIRIN 81 MG CHEWABLE TABLET SCH MG: 81 TABLET CHEWABLE at 08:39

## 2019-07-26 NOTE — EKG
Test Reason : 

Blood Pressure : ***/*** mmHG

Vent. Rate : 075 BPM     Atrial Rate : 075 BPM

   P-R Int : 150 ms          QRS Dur : 084 ms

    QT Int : 426 ms       P-R-T Axes : 037 046 070 degrees

   QTc Int : 475 ms

 

Normal sinus rhythm

Normal ECG

When compared with ECG of 24-JUL-2019 06:04, (Unconfirmed)

No significant change was found

Confirmed by DR. JESUS COLLINS ([id number]) on 7/26/2019 8:13:17 AM

 

Referred By:  ROGELIO           Confirmed By:DR. JESUS COLLINS

## 2019-07-26 NOTE — CON
DATE OF CONSULTATION:  



REQUESTING PHYSICIAN:  Cynthia Colvin MD



REASON FOR CONSULTATION:  Worsening hyponatremia.



IMPRESSION:  Hyponatremia, query cause.  This is possibly in the context of

__________ intake.  However, I cannot completely rule out syndrome of inappropriate

antidiuretic hormone. 



PLAN:  Urine chemistry to evaluate the sodium and osmolality __________ categorize

the type of hyponatremia and treat accordingly. 



HISTORY OF PRESENT ILLNESS:  A 78-year-old gentleman who presented here with

weakness, noted to be hyponatremic and over the course of hospitalization, the

patient's sodium has been trending downward.  As a result of this, decision has been

taken to involve Renal in the management of this case. 



PAST MEDICAL HISTORY:  Significant for type 2 diabetes, hypertension, dyslipidemia,

coronary artery disease. 



MEDICATIONS:  Reviewed and as documented on "[employer].



ALLERGIES:  NO KNOWN DRUG ALLERGIES.



FAMILY HISTORY:  Not significantly related to present illness.



SOCIAL HISTORY:  No alcohol, no tobacco, no illicit drug use.



REVIEW OF SYSTEMS:  As documented in the body of the history.  All other systems

were reviewed and found not to be significantly related to present illness. 



PHYSICAL EXAMINATION:

GENERAL:  The patient was found not to be in any obvious distress with the following

vital signs. 

VITAL SIGNS:  Afebrile, temperature 97.7, pulse 90, respiratory rate of 20, O2

saturation of 96% with a blood pressure of 151/73. 

HEENT:  Unremarkable. 

CARDIOVASCULAR:  First and second heart sounds were heard. 

RESPIRATORY:  Clear to auscultation. 

DIGESTIVE:  Revealed a benign abdomen.  Positive bowel sounds. 

EXTREMITIES:  No peripheral edema. 

SKIN:  No new gross rash. 

LYMPHATICS:  No peripheral lymphadenopathy.



SUMMARY:  A 78-year-old gentleman who is experiencing worsening hyponatremia,

possibly in the context of syndrome of inappropriate antidiuretic hormone. 







Job ID:  [id number]

## 2019-07-26 NOTE — PRG
DATE OF SERVICE:  07/26/2019



SUBJECTIVE:  Patient noted with the following vital signs.



OBJECTIVE:  VITAL SIGNS:  Afebrile, temperature 98.5; pulse 65; respiratory rate of

20; O2 saturation of 96%; blood pressure 158/70. 

HEENT:  Unremarkable. 

CARDIOVASCULAR SYSTEM:  First and second heart sounds were heard. 

RESPIRATORY SYSTEM:  Clear to auscultation. 

DIGESTIVE SYSTEM:  Revealed a benign abdomen with positive bowel sounds. 

EXTREMITIES:  No peripheral edema. 

SKIN:  No new gross rash. 

LYMPHATICS:  No peripheral lymphadenopathy.



LABORATORY INVESTIGATION:  __________ for now.  Urine chemistry consistent with

SIADH with urine osmolality of 439 and urine sodium of 57. 



IMPRESSION:  

1. Hyponatremia in the context of __________.

2. Syndrome of inappropriate antidiuretic hormone.



PLAN:  

1. We will start this patient on anti-ADH medication tolvaptan.

2. Repeat sodium level check later today status post initiation of tolvaptan.

3. Adjust the diet to include increased portion of meat.

4. Further management to be dependent on the clinical course.







Job ID:  [id number]

## 2019-07-27 LAB
ANION GAP SERPL CALC-SCNC: 12 MMOL/L (ref 10–20)
BUN SERPL-MCNC: 15 MG/DL (ref 8.4–25.7)
CALCIUM SERPL-MCNC: 9.6 MG/DL (ref 7.8–10.44)
CHLORIDE SERPL-SCNC: 98 MMOL/L (ref 98–107)
CO2 SERPL-SCNC: 25 MMOL/L (ref 23–31)
CREAT CL PREDICTED SERPL C-G-VRATE: 62 ML/MIN (ref 70–130)
GLUCOSE SERPL-MCNC: 74 MG/DL (ref 83–110)
POTASSIUM SERPL-SCNC: 3.8 MMOL/L (ref 3.5–5.1)
SODIUM SERPL-SCNC: 131 MMOL/L (ref 136–145)

## 2019-07-27 RX ADMIN — INSULIN GLARGINE SCH MLS: 100 INJECTION, SOLUTION SUBCUTANEOUS at 08:00

## 2019-07-27 RX ADMIN — DORZOLAMIDE HYDROCHLORIDE AND TIMOLOL MALEATE SCH DROP: 22.3; 6.8 SOLUTION/ DROPS OPHTHALMIC at 20:32

## 2019-07-27 RX ADMIN — DORZOLAMIDE HYDROCHLORIDE AND TIMOLOL MALEATE SCH DROP: 22.3; 6.8 SOLUTION/ DROPS OPHTHALMIC at 08:00

## 2019-07-27 RX ADMIN — INSULIN GLARGINE SCH MLS: 100 INJECTION, SOLUTION SUBCUTANEOUS at 20:33

## 2019-07-27 RX ADMIN — INSULIN LISPRO PRN UNIT: 100 INJECTION, SOLUTION INTRAVENOUS; SUBCUTANEOUS at 17:07

## 2019-07-27 RX ADMIN — ASPIRIN 81 MG CHEWABLE TABLET SCH MG: 81 TABLET CHEWABLE at 07:58

## 2019-07-28 VITALS — TEMPERATURE: 98.1 F | SYSTOLIC BLOOD PRESSURE: 132 MMHG | DIASTOLIC BLOOD PRESSURE: 72 MMHG

## 2019-07-28 LAB
ANION GAP SERPL CALC-SCNC: 11 MMOL/L (ref 10–20)
BUN SERPL-MCNC: 14 MG/DL (ref 8.4–25.7)
CALCIUM SERPL-MCNC: 8.9 MG/DL (ref 7.8–10.44)
CHLORIDE SERPL-SCNC: 99 MMOL/L (ref 98–107)
CO2 SERPL-SCNC: 23 MMOL/L (ref 23–31)
CREAT CL PREDICTED SERPL C-G-VRATE: 60 ML/MIN (ref 70–130)
GLUCOSE SERPL-MCNC: 271 MG/DL (ref 83–110)
POTASSIUM SERPL-SCNC: 3.8 MMOL/L (ref 3.5–5.1)
SODIUM SERPL-SCNC: 129 MMOL/L (ref 136–145)

## 2019-07-28 RX ADMIN — ASPIRIN 81 MG CHEWABLE TABLET SCH MG: 81 TABLET CHEWABLE at 09:19

## 2019-07-28 RX ADMIN — INSULIN GLARGINE SCH MLS: 100 INJECTION, SOLUTION SUBCUTANEOUS at 09:21

## 2019-07-28 RX ADMIN — INSULIN LISPRO PRN UNIT: 100 INJECTION, SOLUTION INTRAVENOUS; SUBCUTANEOUS at 12:31

## 2019-07-28 RX ADMIN — DORZOLAMIDE HYDROCHLORIDE AND TIMOLOL MALEATE SCH DROP: 22.3; 6.8 SOLUTION/ DROPS OPHTHALMIC at 09:20

## 2019-07-29 NOTE — PRG
DATE OF SERVICE:  07/27/2019



This is a 30-minute initial visit note, in which 30 minutes were spent reviewing the

imaging record, evaluation, examination of the patient, and formulation of plan.

Greater than 50% time was spent in counseling on Hawa Yanez.  Mr. Yanez is a

very pleasant 78-year-old gentleman.  He states that for the last week, he has had

not so much in the way of __________ pain, but left greater than right lower

extremity pain.  __________ neurologically intact.  CT scan of his lumbar spine was

performed, which demonstrates a relatively flat back __________ lumbar lordosis with

severe spondylitic changes.  MRI is also pending at the time of my evaluation, but I

would not at all be surprised if the patient has symptomatic lumbar stenosis. 



I have discussed with the patient that the best course of action at this point is

physical therapy and we can see him in an outpatient followup.  He seems to be in

good spirits now and with excellent pain control at this time, I think that the

initial course of action will be lumbar epidural steroid injections targeting the

most stenotic areas in the lumbar spine __________ lumbar decompression.  We will

arrange followup in our clinic in the near future.  This was discussed with the

patient __________ lumbar stenosis. 







Job ID:  [id number]

## 2019-07-29 NOTE — PRG
DATE OF SERVICE:  07/28/2019



SUBJECTIVE:  The patient was seen and examined, seems to be feeling much better.



OBJECTIVE:  VITAL SIGNS:  Noted with the following vital signs; afebrile,

temperature 97.6, pulse 74, respiratory rate of 18, __________ 

HEENT:  Unremarkable. 

CARDIOVASCULAR:  First and second heart sounds were heard. 

RESPIRATORY:  Clear to auscultation. 

DIGESTIVE:  Revealed a benign abdomen. 

EXTREMITIES:  No peripheral edema. 

SKIN:  No new gross rash. 

LYMPHATICS:  No peripheral lymphadenopathy.



LABORATORY DATA:  Showed a sodium of 131.



IMPRESSION:  

1. Hyponatremia __________.

2. Syndrome of inappropriate antidiuretic hormone.



PLAN:  

1. We will start calcium as needed.  Discontinue tobacco use.

2. __________ any issues.

3. Condition of patient is good.







Job ID:  [id number]

## 2019-07-29 NOTE — DIS
DATE OF ADMISSION:  07/24/2019



DATE OF DISCHARGE:  07/28/2019



DISCHARGE DIAGNOSES:  

1. Hyponatremia.

2. Lumbar radiculopathy.

3. Lumbar spinal stenosis.

4. Lumbar neuroforaminal stenosis.



SECONDARY CHRONIC DIAGNOSES:  

1. Diabetes mellitus.

2. Hypertension.

3. Coronary artery disease.

4. Chronic kidney disease stage 2. 

5. Hyperlipidemia.



HISTORY OF PRESENT ILLNESS:  This patient is a 78-year-old male, who presented via

the emergency department with a complaint of left lower extremity pain and weakness.

 Please see the dictated history and physical dated 07/24/2019, by Dr. Cynthia Colvin

for full details.  The patient's workup in the emergency department was also notable

for hyponatremia with a sodium of 120.  The patient was subsequently admitted to the

hospital primarily for the hyponatremia. 



HOSPITAL COURSE:  With regard to hyponatremia, the patient initially had gentle

hydration with normal saline at 75 mL/h.  In spite of that, the patient's sodium

actually dropped down as well as to 117.  He subsequently had fluid restriction

consultation by Nephrology.  He subsequently had urine studies performed revealing

urine osmolality of 439 and urine sodium of 57.  Subsequently, the patient was given

a dose of tolvaptan.  Subsequently, his sodium increased up to as high as 131.  The

patient remained asymptomatic with respect to his hyponatremia and on the day of

discharge, it was 129, but was cleared by Nephrology for discharge to have

outpatient followup.  In reviewing the patient's prior records, indicates the

patient has actually only had one sodium measurement of 136 in January of 2018.

Other than that going back to April of 2017, he has been continuously hyponatremic

making this more of a chronic condition. 



With regard to the patient's lumbar radiculopathy, he had a CT scan of the lumbar

spine performed, which revealed multiple levels of lumbar spinal stenosis and

neuroforaminal stenosis.  The patient was then set up for an MRI.  However, due to

discomfort, the patient was unable to accomplish the MRI, had a conversation with

the patient regarding treatment plan.  Given the findings on the CT, it is possible

that he could potentially have some injections of the lumbar spine versus surgical

intervention.  He is hesitant to even consider surgical intervention given his

advanced age and fear of having the poor outcome.  We did discuss the possibility of

followup with a pain management specialist, which he is amenable to.  These issues

were discussed with his daughter as well.  Ultimately opted to have outpatient

followup and continue p.r.n. OTCs for pain as needed. 



The patient's other chronic medical conditions were stable.  He was maintained on

his usual home regimen of medications. 



PHYSICAL EXAMINATION:

VITAL SIGNS:  On the day of discharge, temperature was 97.6, pulse 64, O2 saturation

99% on room air, /65. 

GENERAL:  He is awake, alert, oriented, pleasant, cooperative. 

HEART:  Regular rate and rhythm without murmurs, gallops, or rubs. 

LUNGS:  Clear to auscultation bilaterally with good chest wall expansion and air

exchange. 

ABDOMEN:  Soft, nontender, and nondistended.  Positive bowel sounds.  No masses.  No

organomegaly. 

EXTREMITIES:  No cyanosis, clubbing, or edema.



DISPOSITION:  The patient is discharged to home.



ACTIVITY:  As tolerated.



DIET:  He will have no dietary restrictions, but will remain on a 1500 mL per day

fluid restriction. 



HOME MEDICATIONS:  Will include:

1. Simvastatin 40 mg at bedtime.

2. Metoprolol 100 mg p.o. b.i.d.

3. Felodipine 5 mg p.o. daily.

4. Aspirin 81 mg daily.

5. Insulin glargine 30 units subcu b.i.d.

6. Dorzolamide timolol eye drops 1 drop right eye b.i.d.



FOLLOWUP:  He will follow up with Dr. Pro in 2 weeks.  He can follow up with

Dr. Rojo of Neurosurgery as an outpatient.  He should follow up with Dr. Garcias,

his primary care physician who may wish to refer him to a pain management

specialist.  The patient is welcome to return to the hospital should he have any

problems prior to his followup. 



TIME SPENT:  Total time in discharge activities including face-to-face time with the

patient, accounting for more than 50% of the time was a total of 31 minutes. 







Job ID:  [id number]

## 2019-12-16 ENCOUNTER — HOSPITAL ENCOUNTER (INPATIENT)
Dept: HOSPITAL 57 - BURMED | Age: 79
LOS: 4 days | Discharge: HOME | DRG: 950 | End: 2019-12-20
Attending: FAMILY MEDICINE | Admitting: FAMILY MEDICINE
Payer: MEDICARE

## 2019-12-16 VITALS — BODY MASS INDEX: 21.2 KG/M2

## 2019-12-16 DIAGNOSIS — Z79.84: ICD-10-CM

## 2019-12-16 DIAGNOSIS — Z91.14: ICD-10-CM

## 2019-12-16 DIAGNOSIS — I25.2: ICD-10-CM

## 2019-12-16 DIAGNOSIS — Z79.4: ICD-10-CM

## 2019-12-16 DIAGNOSIS — Z79.82: ICD-10-CM

## 2019-12-16 DIAGNOSIS — Z90.49: ICD-10-CM

## 2019-12-16 DIAGNOSIS — S06.6X0D: Primary | ICD-10-CM

## 2019-12-16 DIAGNOSIS — E11.65: ICD-10-CM
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
## 2019-12-16 DIAGNOSIS — R53.81: ICD-10-CM

## 2019-12-16 PROCEDURE — 36416 COLLJ CAPILLARY BLOOD SPEC: CPT

## 2019-12-16 RX ADMIN — INSULIN LISPRO PRN UNIT: 100 INJECTION, SOLUTION INTRAVENOUS; SUBCUTANEOUS at 22:12

## 2019-12-16 RX ADMIN — INSULIN LISPRO PRN UNIT: 100 INJECTION, SOLUTION INTRAVENOUS; SUBCUTANEOUS at 19:09

## 2019-12-17 RX ADMIN — INSULIN GLARGINE SCH UNITS: 100 INJECTION, SOLUTION SUBCUTANEOUS at 08:58

## 2019-12-17 RX ADMIN — INSULIN LISPRO PRN UNIT: 100 INJECTION, SOLUTION INTRAVENOUS; SUBCUTANEOUS at 08:24

## 2019-12-17 RX ADMIN — INSULIN LISPRO PRN UNIT: 100 INJECTION, SOLUTION INTRAVENOUS; SUBCUTANEOUS at 17:28

## 2019-12-17 RX ADMIN — INSULIN LISPRO PRN UNIT: 100 INJECTION, SOLUTION INTRAVENOUS; SUBCUTANEOUS at 12:42

## 2019-12-18 RX ADMIN — INSULIN LISPRO PRN UNIT: 100 INJECTION, SOLUTION INTRAVENOUS; SUBCUTANEOUS at 12:57

## 2019-12-18 RX ADMIN — INSULIN LISPRO PRN UNIT: 100 INJECTION, SOLUTION INTRAVENOUS; SUBCUTANEOUS at 20:56

## 2019-12-18 RX ADMIN — INSULIN GLARGINE SCH UNITS: 100 INJECTION, SOLUTION SUBCUTANEOUS at 08:58

## 2019-12-18 RX ADMIN — INSULIN LISPRO PRN UNIT: 100 INJECTION, SOLUTION INTRAVENOUS; SUBCUTANEOUS at 17:10

## 2019-12-18 RX ADMIN — INSULIN LISPRO PRN UNIT: 100 INJECTION, SOLUTION INTRAVENOUS; SUBCUTANEOUS at 09:03

## 2019-12-19 RX ADMIN — INSULIN LISPRO PRN UNIT: 100 INJECTION, SOLUTION INTRAVENOUS; SUBCUTANEOUS at 21:02

## 2019-12-19 RX ADMIN — INSULIN LISPRO PRN UNIT: 100 INJECTION, SOLUTION INTRAVENOUS; SUBCUTANEOUS at 12:55

## 2019-12-19 RX ADMIN — INSULIN LISPRO PRN UNIT: 100 INJECTION, SOLUTION INTRAVENOUS; SUBCUTANEOUS at 09:24

## 2019-12-19 RX ADMIN — INSULIN LISPRO PRN UNIT: 100 INJECTION, SOLUTION INTRAVENOUS; SUBCUTANEOUS at 17:54

## 2019-12-20 VITALS — SYSTOLIC BLOOD PRESSURE: 160 MMHG | DIASTOLIC BLOOD PRESSURE: 67 MMHG | TEMPERATURE: 98 F

## 2019-12-20 RX ADMIN — INSULIN LISPRO PRN UNIT: 100 INJECTION, SOLUTION INTRAVENOUS; SUBCUTANEOUS at 08:39

## 2020-05-30 ENCOUNTER — HOSPITAL ENCOUNTER (EMERGENCY)
Dept: HOSPITAL 57 - BURERS | Age: 80
Discharge: HOME | End: 2020-05-30
Payer: MEDICARE

## 2020-05-30 DIAGNOSIS — Z23: ICD-10-CM

## 2020-05-30 DIAGNOSIS — E78.5: ICD-10-CM

## 2020-05-30 DIAGNOSIS — Z79.4: ICD-10-CM

## 2020-05-30 DIAGNOSIS — I25.2: ICD-10-CM
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
## 2020-05-30 DIAGNOSIS — S61.216A: Primary | ICD-10-CM

## 2020-05-30 DIAGNOSIS — E11.9: ICD-10-CM

## 2020-05-30 DIAGNOSIS — I10: ICD-10-CM

## 2020-05-30 PROCEDURE — 90715 TDAP VACCINE 7 YRS/> IM: CPT

## 2020-05-30 PROCEDURE — 90471 IMMUNIZATION ADMIN: CPT

## 2020-05-30 PROCEDURE — 12002 RPR S/N/AX/GEN/TRNK2.6-7.5CM: CPT

## 2022-01-18 NOTE — PDOC.HOSPP
- Objective


Result Diagrams: 


 07/24/19 06:11





 07/24/19 06:11





ROS





- Review of Systems


All systems: 


All other ROS were reviewed and found negative.
- Subjective


Subjective: 





Feels ok.  Still has some discomfort in LLE.  Walking with walker. 





- Objective


Vital Signs & Weight: 


 Vital Signs (12 hours)











  Temp Pulse Resp BP BP BP Pulse Ox


 


 07/27/19 08:00        95


 


 07/27/19 07:58   75   133/63   


 


 07/27/19 07:37  99.1 F  75  18   133/63   95


 


 07/27/19 04:00  99.5 F  75  20    152/68 H  94 L


 


 07/27/19 02:10  98.6 F      


 


 07/27/19 00:28  99.5 F  67  20    150/70 H  93 L








 Weight











Admit Weight                   127 lb 13.89 oz


 


Weight                         127 lb 13.89 oz














I&O: 


 











 07/26/19 07/27/19 07/28/19





 06:59 06:59 06:59


 


Intake Total 120 1300 


 


Balance 120 1300 











Result Diagrams: 


 07/24/19 06:11





 07/27/19 06:10


Additional Labs: 


 Accuchecks











  07/27/19 07/26/19 07/26/19





  04:30 19:06 16:10


 


POC Glucose  75  242 H  128 H














  07/26/19





  11:15


 


POC Glucose  227 H














ROS





- Review of Systems


All systems: 


All other ROS were reviewed and found negative.








- Medication


Medications: 


Active Medications











Generic Name Dose Route Start Last Admin





  Trade Name Jerry  PRN Reason Stop Dose Admin


 


Acetaminophen  650 mg  07/24/19 10:04  07/27/19 04:34





  Tylenol  PO   650 mg





  Q4H PRN   Administration





  Headache/Fever/Mild Pain (1-3)   





     





     





     


 


Amlodipine Besylate  5 mg  07/26/19 09:00  07/27/19 07:58





  Norvasc  PO   5 mg





  DAILY SHONDA   Administration





     





     





     





     


 


Aspirin  81 mg  07/26/19 09:00  07/27/19 07:58





  Aspirin Chewable  PO   81 mg





  DAILY SHONDA   Administration





     





     





     





     


 


Dorzolamide/Timolol  1 drop  07/25/19 21:00  07/27/19 08:00





  Cosopt 2-0.5% Ophth Soln  R EYE   1 drop





  BID SHONDA   Administration





     





     





     





     


 


Enoxaparin Sodium  40 mg  07/25/19 09:00  07/27/19 08:00





  Lovenox  SC   40 mg





  0900 SHONDA   Administration





     





     





     





     


 


Insulin Glargine 30 units/  0.3 mls @ 0 mls/hr  07/24/19 21:00  07/27/19 08:00





  Miscellaneous Medication  SC   0.3 mls





  BID SHONDA   Administration





     





     





     





     


 


Insulin Human Lispro  0 units  07/24/19 10:04  07/26/19 20:26





  Humalog  SC   4 unit





  .MODERATE SLIDING SC PRN   Administration





  Moderate Correctional Scale   





     





     





     


 


Metoprolol Tartrate  100 mg  07/25/19 21:00  07/27/19 08:01





  Lopressor  PO   100 mg





  BID SHONDA   Administration





     





     





     





     


 


Naproxen  500 mg  07/24/19 17:00  07/27/19 07:59





  Naprosyn  PO   500 mg





  BID-WM SHONDA   Administration





     





     





     





     


 


Simvastatin  40 mg  07/25/19 21:00  07/26/19 20:05





  Zocor  PO   40 mg





  HS SHONDA   Administration





     





     





     





     














- Exam


NAD


Neck: supple, symmetric, no JVD, no Thyromegaly, no lymphadenopathy, no carotid 

bruit


Heart: RRR, no murmur, no gallops, no rubs, normal peripheral pulses


Respiratory: CTAB, no wheezes, no rales, no ronchi, normal chest expansion, no 

tachypnea, normal percussion


Gastrointestinal: soft, non-tender, non-distended, normal bowel sounds, no 

palpable masses, no hepatomegaly, no splenomegaly, no bruit


Neurological: normal sensation to touch, no weakness, no focal deficits, no new 

deficit


Musculoskeletal: normal tone, normal strength, no muscle wasting





Hosp A/P


(1) Lumbar radiculopathy


Code(s): M54.16 - RADICULOPATHY, LUMBAR REGION   Status: Acute   





(2) Acute metabolic encephalopathy


Code(s): G93.41 - METABOLIC ENCEPHALOPATHY   Status: Acute   





(3) Hyponatremia


Code(s): E87.1 - HYPO-OSMOLALITY AND HYPONATREMIA   Status: Acute   





(4) CKD (chronic kidney disease), stage II


Code(s): N18.2 - CHRONIC KIDNEY DISEASE, STAGE 2 (MILD)   Status: Chronic   





(5) DM II (diabetes mellitus, type II), controlled


Code(s): E11.9 - TYPE 2 DIABETES MELLITUS WITHOUT COMPLICATIONS   Status: 

Chronic   





- Plan





Sodium is better after Tolvaptan.  Nephrology following.


Could not get the MRI.  Too uncomfortable to stay in the required position.


Discussed the findings of the CT.  NS was consulted, but has not seen him yet.  

If the CT is accurate, he does have spinal and neural foraminal stenosis at 

multiple lumbar levels.  Surgery might be an option.  He is not sure that he 

wants to consider surgery given his age and risk.  Discussed the possibility of 

injections as well.  If he is not interested in pursuing surgery, he can follow 

up with NS as outpatient.  If he is, he can see NS here.  They may see him 

today either way.  He may need OP MRI that is more open and allow better 

positioning.  


He wants to discuss with his daughter.
- Subjective


Subjective: 





f/u for hyponatremia and LLE weakness/pain with L-spine DJD. Feels better 

overall but could not complete MRI L-spine this am.





- Objective


Vital Signs & Weight: 


 Vital Signs (12 hours)











  Temp Pulse Resp BP BP BP Pulse Ox


 


 07/26/19 11:12  97.9 F  65  16    139/68  94 L


 


 07/26/19 08:49        98


 


 07/26/19 08:39   65   124/62   


 


 07/26/19 08:38      124/62  


 


 07/26/19 07:52  97.9 F  65  16    174/71 H  98


 


 07/26/19 04:40  98.5 F  65  20    158/70 H  96


 


 07/26/19 00:22  98.7 F  64  20    153/75 H  97








 Weight











Admit Weight                   127 lb 13.89 oz


 


Weight                         127 lb 13.89 oz














I&O: 


 











 07/25/19 07/26/19 07/27/19





 06:59 06:59 06:59


 


Intake Total 2860 120 


 


Output Total 425  


 


Balance 2435 120 











Result Diagrams: 


 07/24/19 06:11





 07/26/19 15:11


Additional Labs: 


 Accuchecks











  07/26/19 07/25/19 07/25/19





  04:35 19:11 16:35


 


POC Glucose  82  229 H  179 H














  07/25/19





  11:33


 


POC Glucose  225 H








Microbiology





12/20/17 01:35   Nasopharyngeal swab   Influenza Types A,B Direct EIA - Final


12/20/17 00:48   Venous blood - Right Arm   Blood Culture - Preliminary


                              Specimen has been received and culture in 

progress.


                              No Growth to date.


12/20/17 00:40   Venous blood - Left Arm   Blood Culture - Preliminary


                              Specimen has been received and culture in 

progress.


                              No Growth to date.





 Laboratory Tests











  12/20/17 12/20/17 12/21/17





  00:40 00:40 04:32


 


Hgb  11.7 L  


 


Neutrophils %  82.8 H  


 


Neutrophils % (Manual)    62


 


Sodium   120 L 


 


Potassium   


 


Creatinine   1.26 


 


Iron   


 


TIBC   


 


Vitamin B12   


 


Cortisol   














  07/24/19 07/24/19 07/24/19





  06:11 06:11 12:00


 


Hgb   


 


Neutrophils %   


 


Neutrophils % (Manual)   


 


Sodium  120 L   120 L


 


Potassium  4.0   3.8


 


Creatinine   


 


Iron   31 L 


 


TIBC   274 


 


Vitamin B12   


 


Cortisol   














  07/24/19 07/24/19 07/24/19





  12:00 12:00 17:59


 


Hgb   


 


Neutrophils %   


 


Neutrophils % (Manual)   


 


Sodium    117 L*


 


Potassium    3.7


 


Creatinine   


 


Iron   


 


TIBC   


 


Vitamin B12   183 L 


 


Cortisol  16.10  














ROS





- Review of Systems


All systems: 


All other ROS were reviewed and found negative.








- Medication


Medications: 


Active Medications











Generic Name Dose Route Start Last Admin





  Trade Name Freq  PRN Reason Stop Dose Admin


 


Acetaminophen  650 mg  07/24/19 10:04  07/26/19 07:16





  Tylenol  PO   650 mg





  Q4H PRN   Administration





  Headache/Fever/Mild Pain (1-3)   





     





     





     


 


Amlodipine Besylate  5 mg  07/26/19 09:00  07/26/19 08:39





  Norvasc  PO   5 mg





  DAILY SHONDA   Administration





     





     





     





     


 


Aspirin  81 mg  07/26/19 09:00  07/26/19 08:39





  Aspirin Chewable  PO   81 mg





  DAILY SHONDA   Administration





     





     





     





     


 


Dorzolamide/Timolol  1 drop  07/25/19 21:00  07/26/19 08:40





  Cosopt 2-0.5% Ophth Soln  R EYE   1 drop





  BID SHONDA   Administration





     





     





     





     


 


Enoxaparin Sodium  40 mg  07/25/19 09:00  07/26/19 08:41





  Lovenox  SC   40 mg





  0900 SHONDA   Administration





     





     





     





     


 


Insulin Glargine 30 units/  0.3 mls @ 0 mls/hr  07/24/19 21:00  07/25/19 20:20





  Miscellaneous Medication  SC   0.3 mls





  BID SHONDA   Administration





     





     





     





     


 


Insulin Human Lispro  0 units  07/24/19 10:04  07/25/19 16:38





  Humalog  SC   2 unit





  .MODERATE SLIDING SC PRN   Administration





  Moderate Correctional Scale   





     





     





     


 


Lorazepam  0.5 mg  07/26/19 06:00  07/26/19 07:16





  Ativan  PO  07/26/19 20:00  0.5 mg





  WILLCALL SHONDA   Administration





     





     





     





     


 


Metoprolol Tartrate  100 mg  07/25/19 21:00  07/26/19 08:39





  Lopressor  PO   100 mg





  BID SHONDA   Administration





     





     





     





     


 


Naproxen  500 mg  07/24/19 17:00  07/26/19 08:39





  Naprosyn  PO   500 mg





  BID-WM SHONDA   Administration





     





     





     





     


 


Simvastatin  40 mg  07/25/19 21:00  07/25/19 20:19





  Zocor  PO   40 mg





  HS SHONDA   Administration





     





     





     





     














- Exam


NAD, awake alert


Eye: PERRL, anicteric sclera


ENT: normocephalic atraumatic, no oropharyngeal lesions


Neck: supple, symmetric, no JVD, no Thyromegaly


Heart: RRR, no murmur, no gallops, no rubs, normal peripheral pulses


Respiratory: CTAB, no wheezes, no rales, no ronchi


Gastrointestinal: soft, non-tender, non-distended, normal bowel sounds, no 

palpable masses


Extremities: no cyanosis, no clubbing, no edema


Neurological: CN's grossly intact, no new deficit (Weakness of LLE in hip 

flexion, no foot drop)


Psychiatric: normal behavior, A&O x 3





Hosp A/P


(1) Hyponatremia


Code(s): E87.1 - HYPO-OSMOLALITY AND HYPONATREMIA   Status: Acute   


Plan: 


Improved with fluid restriction, received Tolvaptan x 1 dose this am, serial Na=








(2) Lumbar radiculopathy


Code(s): M54.16 - RADICULOPATHY, LUMBAR REGION   Status: Acute   


Plan: 


Plan for outpt follow up with NS for evaluation, unable to complete MRI L-spine








(3) CKD (chronic kidney disease), stage II


Code(s): N18.2 - CHRONIC KIDNEY DISEASE, STAGE 2 (MILD)   Status: Chronic   


Plan: 


Stable, avoid nephrotoxic meds and limit contrast exposure








(4) DM II (diabetes mellitus, type II), controlled


Code(s): E11.9 - TYPE 2 DIABETES MELLITUS WITHOUT COMPLICATIONS   Status: 

Chronic   


Plan: 


Continue Glargine, ISS








- Plan


PT/OT, , out of bed/ambulate





Stable currently


Vaprisol IV x 1 dose today


OOB with PT


AM lab: BMP


Likely home in am 7/27/19
- Subjective


Subjective: 





f/u lumbar radiculopathy and hyponatremia. c/o LLE pain down to calf, no 

incontinence. No N/V, visual disturbance or cough.





- Objective


Vital Signs & Weight: 


 Vital Signs (12 hours)











  Temp Pulse Resp BP BP Pulse Ox


 


 07/25/19 15:35  97.5 F L  77  16  129/65   96


 


 07/25/19 12:00  98.3 F  72  14   131/78  96


 


 07/25/19 08:00  97.6 F  68  14   138/72  96


 


 07/25/19 07:38       96








 Weight











Admit Weight                   127 lb 13.89 oz


 


Weight                         127 lb 13.89 oz














I&O: 


 











 07/24/19 07/25/19 07/26/19





 06:59 06:59 06:59


 


Intake Total  2860 


 


Output Total  425 


 


Balance  2435 











Result Diagrams: 


 07/24/19 06:11





 07/24/19 23:41


Additional Labs: 


 Accuchecks











  07/25/19 07/25/19 07/24/19





  11:33 04:53 23:08


 


POC Glucose  225 H  112 H  163 H














  07/24/19 07/24/19





  19:32 16:44


 


POC Glucose  288 H  216 H








Microbiology





12/20/17 01:35   Nasopharyngeal swab   Influenza Types A,B Direct EIA - Final


12/20/17 00:48   Venous blood - Right Arm   Blood Culture - Preliminary


                              Specimen has been received and culture in 

progress.


                              No Growth to date.


12/20/17 00:40   Venous blood - Left Arm   Blood Culture - Preliminary


                              Specimen has been received and culture in 

progress.


                              No Growth to date.





 Laboratory Tests











  12/20/17 12/20/17 12/21/17





  00:40 00:40 04:32


 


Hgb  11.7 L  


 


Neutrophils %  82.8 H  


 


Neutrophils % (Manual)    62


 


Sodium   120 L 


 


Potassium   


 


Creatinine   1.26 


 


Iron   


 


TIBC   


 


Vitamin B12   


 


Cortisol   














  07/24/19 07/24/19 07/24/19





  06:11 06:11 12:00


 


Hgb   


 


Neutrophils %   


 


Neutrophils % (Manual)   


 


Sodium  120 L   120 L


 


Potassium  4.0   3.8


 


Creatinine   


 


Iron   31 L 


 


TIBC   274 


 


Vitamin B12   


 


Cortisol   














  07/24/19 07/24/19 07/24/19





  12:00 12:00 17:59


 


Hgb   


 


Neutrophils %   


 


Neutrophils % (Manual)   


 


Sodium    117 L*


 


Potassium    3.7


 


Creatinine   


 


Iron   


 


TIBC   


 


Vitamin B12   183 L 


 


Cortisol  16.10  











Radiology Reviewed by me: Yes (CT L-spine - severe DJD)





ROS





- Review of Systems


All systems: 


All other ROS were reviewed and found negative.








- Medication


Medications: 


Active Medications











Generic Name Dose Route Start Last Admin





  Trade Name Freq  PRN Reason Stop Dose Admin


 


Enoxaparin Sodium  40 mg  07/25/19 09:00  07/25/19 08:16





  Lovenox  SC   40 mg





  0900 SHONDA   Administration





     





     





     





     


 


Insulin Glargine 30 units/  0.3 mls @ 0 mls/hr  07/24/19 21:00  07/25/19 12:07





  Miscellaneous Medication  SC   0.3 mls





  BID SHONDA   Administration





     





     





     





     


 


Insulin Human Lispro  0 units  07/24/19 10:04  07/25/19 12:08





  Humalog  SC   4 unit





  .MODERATE SLIDING SC PRN   Administration





  Moderate Correctional Scale   





     





     





     


 


Naproxen  500 mg  07/24/19 17:00  07/25/19 08:15





  Naprosyn  PO   500 mg





  BID-WM SHONDA   Administration





     





     





     





     














- Exam


NAD, awake alert


Eye: PERRL, anicteric sclera


ENT: normocephalic atraumatic, no oropharyngeal lesions


Neck: supple, symmetric, no JVD, no Thyromegaly


Heart: RRR, no murmur, no gallops, no rubs


Respiratory: CTAB, no wheezes, no ronchi


Gastrointestinal: soft, non-tender, non-distended, normal bowel sounds


Extremities: no cyanosis, no clubbing, no edema


Neurological: CN's grossly intact, no focal deficits, no new deficit (LLE with 4

/5 strength)


Musculoskeletal: generalized weakness (No foot drop, FAPROM L hip)


Psychiatric: normal behavior, A&O x 3





Hosp A/P


(1) Hyponatremia


Code(s): E87.1 - HYPO-OSMOLALITY AND HYPONATREMIA   Status: Acute   


Plan: 


Acute/chronic, likely SIADH, Fluid restrict 1.2L/24h, saline lock IVF, consult 

Nephrology for any further recommendations, serial Na+








(2) Lumbar radiculopathy


Code(s): M54.16 - RADICULOPATHY, LUMBAR REGION   Status: Acute   


Plan: 


Acute/chronic, check MRI L-spine, consult Neurosurgery, pain control as 

clinically indicated, PT evaluation for functional assessment








(3) CKD (chronic kidney disease), stage II


Code(s): N18.2 - CHRONIC KIDNEY DISEASE, STAGE 2 (MILD)   Status: Chronic   


Plan: 


Avoid nephrotoxic agents, limit contrast exposure








(4) DM II (diabetes mellitus, type II), controlled


Code(s): E11.9 - TYPE 2 DIABETES MELLITUS WITHOUT COMPLICATIONS   Status: 

Chronic   


Plan: 


Resume Lantus, serial accuchecks, ADA








- Plan


PT/OT, , out of bed/ambulate, DVT proph w/SCDs





Stable currently


Saline lock IVF


Fluid restrict 1.2L


PT evaluation 


MRI L-spine


Neurosurgery consult for lumbar radiculopathy


AM lab: BMP
Patient

## 2022-02-22 ENCOUNTER — HOSPITAL ENCOUNTER (EMERGENCY)
Dept: HOSPITAL 92 - CSHERS | Age: 82
Discharge: HOME | End: 2022-02-22
Payer: MEDICARE

## 2022-02-22 ENCOUNTER — HOSPITAL ENCOUNTER (OUTPATIENT)
Dept: HOSPITAL 92 - CSHWCC | Age: 82
Discharge: HOME | End: 2022-02-22
Attending: NURSE PRACTITIONER
Payer: MEDICARE

## 2022-02-22 DIAGNOSIS — L89.610: ICD-10-CM

## 2022-02-22 DIAGNOSIS — K21.9: ICD-10-CM

## 2022-02-22 DIAGNOSIS — L03.115: Primary | ICD-10-CM

## 2022-02-22 DIAGNOSIS — E11.9: ICD-10-CM

## 2022-02-22 DIAGNOSIS — L89.890: ICD-10-CM

## 2022-02-22 DIAGNOSIS — L89.510: Primary | ICD-10-CM

## 2022-02-22 DIAGNOSIS — L97.519: ICD-10-CM

## 2022-02-22 DIAGNOSIS — L89.510: ICD-10-CM

## 2022-02-22 DIAGNOSIS — E78.5: ICD-10-CM

## 2022-02-22 LAB
ALBUMIN SERPL BCG-MCNC: 3.4 G/DL (ref 3.4–4.8)
ALP SERPL-CCNC: 181 U/L (ref 40–110)
ALT SERPL W P-5'-P-CCNC: 87 U/L (ref 8–55)
ANION GAP SERPL CALC-SCNC: 14 MMOL/L (ref 10–20)
AST SERPL-CCNC: 38 U/L (ref 5–34)
BASOPHILS # BLD AUTO: 0.1 10X3/UL (ref 0–0.2)
BASOPHILS NFR BLD AUTO: 0.6 % (ref 0–2)
BILIRUB SERPL-MCNC: 0.4 MG/DL (ref 0.2–1.2)
BUN SERPL-MCNC: 44 MG/DL (ref 8.4–25.7)
CALCIUM SERPL-MCNC: 9.1 MG/DL (ref 7.8–10.44)
CHLORIDE SERPL-SCNC: 96 MMOL/L (ref 98–107)
CO2 SERPL-SCNC: 27 MMOL/L (ref 23–31)
CREAT CL PREDICTED SERPL C-G-VRATE: 0 ML/MIN (ref 70–130)
EOSINOPHIL # BLD AUTO: 0.1 10X3/UL (ref 0–0.5)
EOSINOPHIL NFR BLD AUTO: 1.3 % (ref 0–6)
GLOBULIN SER CALC-MCNC: 4.3 G/DL (ref 2.4–3.5)
GLUCOSE SERPL-MCNC: 306 MG/DL (ref 83–110)
HGB BLD-MCNC: 11.6 G/DL (ref 13.5–17.5)
LYMPHOCYTES NFR BLD AUTO: 19.1 % (ref 18–47)
MCH RBC QN AUTO: 26.8 PG (ref 27–33)
MCV RBC AUTO: 82.7 FL (ref 81.2–95.1)
MONOCYTES # BLD AUTO: 0.8 10X3/UL (ref 0–1.1)
MONOCYTES NFR BLD AUTO: 9.1 % (ref 0–10)
NEUTROPHILS # BLD AUTO: 5.7 10X3/UL (ref 1.5–8.4)
NEUTROPHILS NFR BLD AUTO: 69.3 % (ref 40–75)
PLATELET # BLD AUTO: 281 10X3/UL (ref 150–450)
POTASSIUM SERPL-SCNC: 4.9 MMOL/L (ref 3.5–5.1)
RBC # BLD AUTO: 4.33 10X6/UL (ref 4.32–5.72)
SODIUM SERPL-SCNC: 132 MMOL/L (ref 136–145)
WBC # BLD AUTO: 8.3 10X3/UL (ref 3.5–10.5)

## 2022-02-22 PROCEDURE — G0463 HOSPITAL OUTPT CLINIC VISIT: HCPCS

## 2022-02-22 PROCEDURE — 99204 OFFICE O/P NEW MOD 45 MIN: CPT

## 2022-02-22 PROCEDURE — 85025 COMPLETE CBC W/AUTO DIFF WBC: CPT

## 2022-02-22 PROCEDURE — 80053 COMPREHEN METABOLIC PANEL: CPT

## 2022-02-22 PROCEDURE — 83605 ASSAY OF LACTIC ACID: CPT

## 2022-05-07 ENCOUNTER — HOSPITAL ENCOUNTER (INPATIENT)
Dept: HOSPITAL 92 - ERS | Age: 82
LOS: 4 days | Discharge: NURSING, REHAB OR LONG-TERM CARE | DRG: 871 | End: 2022-05-11
Attending: FAMILY MEDICINE | Admitting: FAMILY MEDICINE
Payer: MEDICARE

## 2022-05-07 VITALS — BODY MASS INDEX: 19.2 KG/M2

## 2022-05-07 DIAGNOSIS — R13.10: ICD-10-CM

## 2022-05-07 DIAGNOSIS — Z79.899: ICD-10-CM

## 2022-05-07 DIAGNOSIS — Z79.84: ICD-10-CM

## 2022-05-07 DIAGNOSIS — I10: ICD-10-CM

## 2022-05-07 DIAGNOSIS — I69.920: ICD-10-CM

## 2022-05-07 DIAGNOSIS — G40.909: ICD-10-CM

## 2022-05-07 DIAGNOSIS — J69.0: ICD-10-CM

## 2022-05-07 DIAGNOSIS — E78.5: ICD-10-CM

## 2022-05-07 DIAGNOSIS — E11.621: ICD-10-CM

## 2022-05-07 DIAGNOSIS — Z93.1: ICD-10-CM

## 2022-05-07 DIAGNOSIS — I69.991: ICD-10-CM

## 2022-05-07 DIAGNOSIS — E11.52: ICD-10-CM

## 2022-05-07 DIAGNOSIS — K21.9: ICD-10-CM

## 2022-05-07 DIAGNOSIS — Z79.4: ICD-10-CM

## 2022-05-07 DIAGNOSIS — N39.0: ICD-10-CM

## 2022-05-07 DIAGNOSIS — E11.65: ICD-10-CM

## 2022-05-07 DIAGNOSIS — A41.81: Primary | ICD-10-CM

## 2022-05-07 DIAGNOSIS — F03.90: ICD-10-CM

## 2022-05-07 DIAGNOSIS — J18.9: ICD-10-CM

## 2022-05-07 DIAGNOSIS — L97.519: ICD-10-CM

## 2022-05-07 DIAGNOSIS — E87.0: ICD-10-CM

## 2022-05-07 DIAGNOSIS — I25.10: ICD-10-CM

## 2022-05-07 LAB
ALBUMIN SERPL BCG-MCNC: 3 G/DL (ref 3.4–4.8)
ALP SERPL-CCNC: 169 U/L (ref 40–110)
ALT SERPL W P-5'-P-CCNC: 34 U/L (ref 8–55)
ANION GAP SERPL CALC-SCNC: 17 MMOL/L (ref 10–20)
AST SERPL-CCNC: 27 U/L (ref 5–34)
BACTERIA UR QL AUTO: (no result) HPF
BILIRUB SERPL-MCNC: 0.2 MG/DL (ref 0.2–1.2)
BUN SERPL-MCNC: 85 MG/DL (ref 8.4–25.7)
CALCIUM SERPL-MCNC: 9.1 MG/DL (ref 7.8–10.44)
CHLORIDE SERPL-SCNC: 111 MMOL/L (ref 98–107)
CO2 SERPL-SCNC: 25 MMOL/L (ref 23–31)
CREAT CL PREDICTED SERPL C-G-VRATE: 0 ML/MIN (ref 70–130)
GLOBULIN SER CALC-MCNC: 4.9 G/DL (ref 2.4–3.5)
GLUCOSE SERPL-MCNC: 274 MG/DL (ref 83–110)
GLUCOSE UR STRIP-MCNC: 500 MG/DL
HGB BLD-MCNC: 9.9 G/DL (ref 14–18)
LEUKOCYTE ESTERASE UR QL STRIP.AUTO: 500 LEU/UL
MCH RBC QN AUTO: 26.6 PG (ref 27–31)
MCV RBC AUTO: 86.9 FL (ref 78–98)
MDIFF COMPLETE?: YES
PLATELET # BLD AUTO: 350 THOU/UL (ref 130–400)
POTASSIUM SERPL-SCNC: 3.9 MMOL/L (ref 3.5–5.1)
PROT UR STRIP.AUTO-MCNC: 50 MG/DL
RBC # BLD AUTO: 3.72 MILL/UL (ref 4.7–6.1)
SODIUM SERPL-SCNC: 149 MMOL/L (ref 136–145)
SP GR UR STRIP: 1.02 (ref 1–1.04)
WBC # BLD AUTO: 11.1 THOU/UL (ref 4.8–10.8)

## 2022-05-07 PROCEDURE — 36416 COLLJ CAPILLARY BLOOD SPEC: CPT

## 2022-05-07 PROCEDURE — 80048 BASIC METABOLIC PNL TOTAL CA: CPT

## 2022-05-07 PROCEDURE — 81015 MICROSCOPIC EXAM OF URINE: CPT

## 2022-05-07 PROCEDURE — 87086 URINE CULTURE/COLONY COUNT: CPT

## 2022-05-07 PROCEDURE — 85025 COMPLETE CBC W/AUTO DIFF WBC: CPT

## 2022-05-07 PROCEDURE — 84484 ASSAY OF TROPONIN QUANT: CPT

## 2022-05-07 PROCEDURE — 87186 SC STD MICRODIL/AGAR DIL: CPT

## 2022-05-07 PROCEDURE — 36415 COLL VENOUS BLD VENIPUNCTURE: CPT

## 2022-05-07 PROCEDURE — 71045 X-RAY EXAM CHEST 1 VIEW: CPT

## 2022-05-07 PROCEDURE — 93005 ELECTROCARDIOGRAM TRACING: CPT

## 2022-05-07 PROCEDURE — 83605 ASSAY OF LACTIC ACID: CPT

## 2022-05-07 PROCEDURE — 80202 ASSAY OF VANCOMYCIN: CPT

## 2022-05-07 PROCEDURE — 87077 CULTURE AEROBIC IDENTIFY: CPT

## 2022-05-07 PROCEDURE — U0003 INFECTIOUS AGENT DETECTION BY NUCLEIC ACID (DNA OR RNA); SEVERE ACUTE RESPIRATORY SYNDROME CORONAVIRUS 2 (SARS-COV-2) (CORONAVIRUS DISEASE [COVID-19]), AMPLIFIED PROBE TECHNIQUE, MAKING USE OF HIGH THROUGHPUT TECHNOLOGIES AS DESCRIBED BY CMS-2020-01-R: HCPCS

## 2022-05-07 PROCEDURE — 80053 COMPREHEN METABOLIC PANEL: CPT

## 2022-05-07 PROCEDURE — 87040 BLOOD CULTURE FOR BACTERIA: CPT

## 2022-05-07 PROCEDURE — U0005 INFEC AGEN DETEC AMPLI PROBE: HCPCS

## 2022-05-07 PROCEDURE — 81003 URINALYSIS AUTO W/O SCOPE: CPT

## 2022-05-08 LAB
ALBUMIN SERPL BCG-MCNC: 2.7 G/DL (ref 3.4–4.8)
ALP SERPL-CCNC: 108 U/L (ref 40–110)
ALT SERPL W P-5'-P-CCNC: 31 U/L (ref 8–55)
ANION GAP SERPL CALC-SCNC: 15 MMOL/L (ref 10–20)
AST SERPL-CCNC: 30 U/L (ref 5–34)
BASOPHILS # BLD AUTO: 0 THOU/UL (ref 0–0.2)
BASOPHILS NFR BLD AUTO: 0.1 % (ref 0–1)
BILIRUB SERPL-MCNC: 0.3 MG/DL (ref 0.2–1.2)
BUN SERPL-MCNC: 74 MG/DL (ref 8.4–25.7)
CALCIUM SERPL-MCNC: 8.5 MG/DL (ref 7.8–10.44)
CHLORIDE SERPL-SCNC: 111 MMOL/L (ref 98–107)
CO2 SERPL-SCNC: 26 MMOL/L (ref 23–31)
CREAT CL PREDICTED SERPL C-G-VRATE: 51 ML/MIN (ref 70–130)
EOSINOPHIL # BLD AUTO: 0 THOU/UL (ref 0–0.7)
EOSINOPHIL NFR BLD AUTO: 0 % (ref 0–10)
GLOBULIN SER CALC-MCNC: 4 G/DL (ref 2.4–3.5)
GLUCOSE SERPL-MCNC: 259 MG/DL (ref 83–110)
HGB BLD-MCNC: 8.8 G/DL (ref 14–18)
LYMPHOCYTES # BLD: 0.9 THOU/UL (ref 1.2–3.4)
LYMPHOCYTES NFR BLD AUTO: 9.8 % (ref 21–51)
MCH RBC QN AUTO: 28 PG (ref 27–31)
MCV RBC AUTO: 87.2 FL (ref 78–98)
MONOCYTES # BLD AUTO: 1 THOU/UL (ref 0.11–0.59)
MONOCYTES NFR BLD AUTO: 11 % (ref 0–10)
NEUTROPHILS # BLD AUTO: 7.2 THOU/UL (ref 1.4–6.5)
NEUTROPHILS NFR BLD AUTO: 79.1 % (ref 42–75)
PLATELET # BLD AUTO: 322 THOU/UL (ref 130–400)
POTASSIUM SERPL-SCNC: 3.6 MMOL/L (ref 3.5–5.1)
RBC # BLD AUTO: 3.14 MILL/UL (ref 4.7–6.1)
SODIUM SERPL-SCNC: 148 MMOL/L (ref 136–145)
TROPONIN I SERPL DL<=0.01 NG/ML-MCNC: 0.11 NG/ML (ref ?–0.03)
TROPONIN I SERPL DL<=0.01 NG/ML-MCNC: 0.16 NG/ML (ref ?–0.03)
WBC # BLD AUTO: 9.1 THOU/UL (ref 4.8–10.8)

## 2022-05-08 RX ADMIN — VANCOMYCIN HYDROCHLORIDE SCH MLS: 750 INJECTION, POWDER, LYOPHILIZED, FOR SOLUTION INTRAVENOUS at 22:14

## 2022-05-09 LAB — VANCOMYCIN TROUGH SERPL-MCNC: 7.1 UG/ML

## 2022-05-09 RX ADMIN — VANCOMYCIN HYDROCHLORIDE SCH MLS: 750 INJECTION, POWDER, LYOPHILIZED, FOR SOLUTION INTRAVENOUS at 22:55

## 2022-05-09 RX ADMIN — INSULIN GLARGINE SCH UNITS: 100 INJECTION, SOLUTION SUBCUTANEOUS at 10:01

## 2022-05-09 RX ADMIN — INSULIN LISPRO PRN UNIT: 100 INJECTION, SOLUTION INTRAVENOUS; SUBCUTANEOUS at 06:17

## 2022-05-09 RX ADMIN — LANSOPRAZOLE SCH MG: KIT at 10:02

## 2022-05-09 RX ADMIN — Medication SCH MG: at 10:00

## 2022-05-09 RX ADMIN — Medication SCH ML: at 10:01

## 2022-05-10 RX ADMIN — INSULIN GLARGINE SCH UNITS: 100 INJECTION, SOLUTION SUBCUTANEOUS at 10:16

## 2022-05-10 RX ADMIN — Medication SCH MG: at 10:16

## 2022-05-10 RX ADMIN — INSULIN LISPRO PRN UNIT: 100 INJECTION, SOLUTION INTRAVENOUS; SUBCUTANEOUS at 18:35

## 2022-05-10 RX ADMIN — Medication SCH ML: at 10:17

## 2022-05-10 RX ADMIN — ALUMINUM ZIRCONIUM TRICHLOROHYDREX GLY SCH: 0.2 STICK TOPICAL at 11:03

## 2022-05-10 RX ADMIN — LANSOPRAZOLE SCH MG: KIT at 10:16

## 2022-05-11 VITALS — SYSTOLIC BLOOD PRESSURE: 126 MMHG | TEMPERATURE: 99.6 F | DIASTOLIC BLOOD PRESSURE: 65 MMHG

## 2022-05-11 LAB
ANION GAP SERPL CALC-SCNC: 14 MMOL/L (ref 10–20)
BUN SERPL-MCNC: 33 MG/DL (ref 8.4–25.7)
CALCIUM SERPL-MCNC: 7.9 MG/DL (ref 7.8–10.44)
CHLORIDE SERPL-SCNC: 104 MMOL/L (ref 98–107)
CO2 SERPL-SCNC: 24 MMOL/L (ref 23–31)
CREAT CL PREDICTED SERPL C-G-VRATE: 67 ML/MIN (ref 70–130)
GLUCOSE SERPL-MCNC: 90 MG/DL (ref 83–110)
HGB BLD-MCNC: 8.5 G/DL (ref 14–18)
MCH RBC QN AUTO: 26.9 PG (ref 27–31)
MCV RBC AUTO: 84.8 FL (ref 78–98)
MDIFF COMPLETE?: YES
PLATELET # BLD AUTO: 359 THOU/UL (ref 130–400)
POTASSIUM SERPL-SCNC: 3.6 MMOL/L (ref 3.5–5.1)
RBC # BLD AUTO: 3.16 MILL/UL (ref 4.7–6.1)
SODIUM SERPL-SCNC: 138 MMOL/L (ref 136–145)
VANCOMYCIN TROUGH SERPL-MCNC: 12.9 UG/ML
WBC # BLD AUTO: 13.9 THOU/UL (ref 4.8–10.8)

## 2022-05-11 RX ADMIN — LANSOPRAZOLE SCH MG: KIT at 10:01

## 2022-05-11 RX ADMIN — Medication SCH ML: at 09:42

## 2022-05-11 RX ADMIN — INSULIN GLARGINE SCH UNITS: 100 INJECTION, SOLUTION SUBCUTANEOUS at 09:42

## 2022-05-11 RX ADMIN — Medication SCH MG: at 09:43
